# Patient Record
Sex: MALE | Race: WHITE | URBAN - METROPOLITAN AREA
[De-identification: names, ages, dates, MRNs, and addresses within clinical notes are randomized per-mention and may not be internally consistent; named-entity substitution may affect disease eponyms.]

---

## 2017-10-31 ENCOUNTER — HOSPITAL ENCOUNTER (OUTPATIENT)
Dept: PHYSICAL THERAPY | Age: 57
Discharge: HOME | End: 2017-10-31
Payer: COMMERCIAL

## 2017-10-31 PROCEDURE — 97163 PT EVAL HIGH COMPLEX 45 MIN: CPT

## 2017-10-31 PROCEDURE — 97110 THERAPEUTIC EXERCISES: CPT

## 2017-10-31 NOTE — PROGRESS NOTES
PT DAILY TREATMENT NOTE     Patient Name: Alexandra Lorenzana  Date:10/31/2017  : 1960  [x]  Patient  Verified  Payor: BLUE [employer]OSS / Plan: [employer] [facility] [place] / Product Type: PPO /    In time:135  Out time:230  Total Treatment Time (min): 55  Visit #: 1 of 1    Treatment Area: There are no admission diagnoses documented for this encounter.     SUBJECTIVE  Pain Level (0-10 scale): 0/10 seated at rest  Any medication changes, allergies to medications, adverse drug reactions, diagnosis change, or new procedure performed?: [x] No    [] Yes (see summary sheet for update)  Subjective functional status/changes:   [] No changes reported  See POC    OBJECTIVE    Modality rationale:    Min Type Additional Details    [] Estim:  []Unatt       []IFC  []Premod                        []Other:  []w/ice   []w/heat  Position:  Location:    [] Estim: []Att    []TENS instruct  []NMES                    []Other:  []w/US   []w/ice   []w/heat  Position:  Location:    []  Traction: [] Cervical       []Lumbar                       [] Prone          []Supine                       []Intermittent   []Continuous Lbs:  [] before manual  [] after manual    []  Ultrasound: []Continuous   [] Pulsed                           []1MHz   []3MHz W/cm2:  Location:    []  Iontophoresis with dexamethasone         Location: [] Take home patch   [] In clinic    []  Ice     []  heat  []  Ice massage  []  Laser   []  Anodyne Position:  Location:    []  Laser with stim  []  Other:  Position:  Location:    []  Vasopneumatic Device Pressure:       [] lo [] med [] hi   Temperature: [] lo [] med [] hi   [] Skin assessment post-treatment:  []intact []redness- no adverse reaction    []redness  adverse reaction:     45 min [x]Eval                  []Re-Eval       10 min Therapeutic Exercise:  [] See flow sheet :issued and reviewed HEP   Rationale: increase strength to improve the patients ability to require less assist for transfers and decrease fall risk     min Therapeutic Activity:  []  See flow sheet :         min Neuromuscular Re-education:  []  See flow sheet :        min Manual Therapy:          min Gait Training:  ___ feet with ___ device on level surfaces with ___ level of assist   Rationale: With   [] TE   [] TA   [] neuro   [] other: Patient Education: [x] Review HEP    [] Progressed/Changed HEP based on:   [] positioning   [] body mechanics   [] transfers   [] heat/ice application    [] other:      Other Objective/Functional Measures:   Physical Therapy Evaluation  Neurologic  Pt presents with congenital demyelinating polyneuropathy that has progressively worsened over time. Pt's primary means of community mobility is currently a power scooter that he has had for the past 11 years. Prior to use of power scooter, pt ambulated with bilateral axillary crutches as primary means of mobility. Currently pt only able to ambulate very short distances in home (bedroom to bathroom) and this is becoming increasingly difficult as pt is considered a significant fall risk. His condition is complicated by intermittent back pain related to scoliosis and arthritis. He also reports decreased sensation in LE's > UE's. Pt right hand dominant. He lives with his parents who are both in their [de-identified] and ability to provide assistance to pt has decreased secondary to their own health problems and aging limitations.    Present Functional Limitations: walking, transfers sit to stand to include out of shower and off toilet, meal prep  PLOF: pt ambulating with bilateral axillary crutches in community > 11 years ago    Posture: [x] Poor    [] Fair    [] Good    Describe:       Gait: [] Normal    [x] Abnormal    Device: bilateral axillary crutches    Describe:    ROM:                             AROM    PROM   Shoulder Left Right Left Right   Flex       Ext       ABD       ER       IR                AROM    PROM   Knee Left Right Left Right   Ext       Flex AROM                           PROM  Hip Left Right Left Right   Flex       Ext       ABD       ER       IR                                                AROM      PROM   Ankle Left Right Left Right   Ext       Flex         Strength (MMT):  Shoulder L (1-5) R (1-5)   Shoulder Flexion     Shoulder Ext     Shoulder ABD     Shoulder ADD     Shoulder IR     Shoulder ER                                               Hip L (1-5) R (1-5)   Hip Flexion 3-/5 <3/5   Hip Ext (seated) 4+/5 4+/5   Hip ABD (seated) 4-/5 4-/5   Hip ADD (seated) 4/5 4/5   Hip ER     Hip IR       Knee L (1-5) R (1-5)   Knee Flexion in sitting 4+/5 4/5   Knee Extension 3/5 3-/5   Ankle PF     Ankle DF <3/5 <3/5   Other       Tone:    Motor Control:    Sensation:    Reflexes: [] Not Tested   Left Right   Biceps (C5)     Brachioradiais (C6)     Triceps (C7)     Knee Jerk (L4)     Ankle Jerk (SI)       Balance/ Equilibrium:              Left            Right  Tracks Across Midline      Reaches Across Midline           Sitting Balance: Static:  [] Good    [x] Fair    [] Poor     Dynamic:   [] Good    [] Fair    [] Poor        Standing Balance: Static:   [] Good    [x] Fair (-)    [x] Poor     Dynamic:   [] Good    [] Fair    [x] Poor        Protective Extension:  [] Present    [] Delayed    [] Absent        Single Leg Stance:         Eyes Open  Eyes Closed   L  L    R  R        Functional Mobility      Bed Mobility:      Scooting:        Rolling:       Sit-Supine:      Transfers:       Sit-Stand: CGA-min assist       Floor-Stand:       Gait: CGA- min assist with B axillary crutches       Tandem:       Backwards:       Braiding:      Elevations:       Curbs:      Ramps:      Stairs:    Behavior: [] Cooperative    [] Impulsive    [] Agitated    [] Perseverative    [] Confused   Oriented x:    Cognition: [] One Step Commands   [] Multiple Commands   [] Displays Neglect [] R  [] L    Other:       Impaired Judgement: [] Y    [] N      Impaired Vision:  [] Y [] N      Safety Awareness Deficits  [] Y    [] N      Impaired Hearing  [] Y    [] N      Able to Express Needs [] Y    [] N    Optional Tests:       Dynamic Gait Index (24pt scale): Functional Gait Assessment (30pt scale):       Blackmon Balance Scale (56pt scale): Other test /comments:  Significant HS tightness bilaterally. Intrinsic foot and hand weakness noted bilaterally   strength: 5 lbs on left, 2 lbs on right (average of 2 trials)  Bilateral shoulder AROM limited for flex, ER, and IR. Hyperextension B elbows noted.     Pain Level (0-10 scale) post treatment: 0/10    ASSESSMENT/Changes in Function: see POC       [x]  See Plan of Care  []  See progress note/recertification  [x]  See Discharge Summary         Progress towards goals / Updated goals:  See POC    PLAN  []  Upgrade activities as tolerated     []  Continue plan of care  []  Update interventions per flow sheet       [x]  Discharge due to: pt to be seen one time for eval/assessment for power chair  []  Other:_      Kelechi Butler PT 10/31/2017  1:10 PM    Future Appointments  Date Time Provider Leeann Waite   10/31/2017 1:30 PM Kelechi Butler, PT [id number] [place]

## 2017-10-31 NOTE — PROGRESS NOTES
[facility] at [address] [place] [address]  Phone: [phone]   Fax: [phone]    Plan of Care/ Statement of Necessity for Physical Therapy Services     Patient name: Patti Menjivar Start of Care: 10/31/2017   Referral source: Sondra Rivas MD : 1960    Medical Diagnosis: Unsteadiness on feet [R26.81]   Onset Date:chronic since birth    Treatment Diagnosis: imbalance, gait abnormality, weakness   Prior Hospitalization: see medical history Provider#: [id number]   Medications: Verified on Patient summary List    Comorbidities: arthritis, scoliosis   Prior Level of Function: ambulating with B axillary crutches > 11 year ago    The Plan of Care and following information is based on the information from the initial evaluation. Assessment/ key information: Pt is a 64 yo male presenting to clinic with congenital demyelinating polyneuropathy that has progressively worsened over time. Pt's primary means of community mobility is currently a power scooter that he has had for the past 11 years and in need of replacement with pt no longer able to rely on this device. Prior to use of power scooter, pt ambulated with bilateral axillary crutches as primary means of mobility. Currently pt only able to ambulate ten feet in home (bedroom to bathroom for example) and this is becoming increasingly difficult as pt is considered a significant fall risk with severe ataxic gait pattern, making the use of a cane, crutches, or walker unsafe. His condition is complicated by intermittent back pain related to scoliosis and arthritis. He also reports decreased sensation in LE's > UE's. Pt right hand dominant. He lives with his parents who are both in their [de-identified] and ability to provide assistance to pt has decreased secondary to their own health problems and aging limitations.  On exam, pt noted to have significant intrinsic foot and hand muscle weakness bilaterally with  strength 5 lbs on left and 2 lbs on right (average of 2 trials) and as such would be unable to mobilize an optimally-configured manual wheelchair. Bilateral shoulder AROM limited for flex, ER, and IR with bilateral hyperextension noted and < 4/5 UE strength. Pt required min assist for sit stand transfers in clinic with B axillary crutches and CGA-min assist to ambulate 10 feet with B axillary crutches and increased time to complete task. Strength (MMT):  Hip L (1-5) R (1-5)   Hip Flexion 3-/5 <3/5   Hip Ext (seated) 4+/5 4+/5   Hip ABD (seated) 4-/5 4-/5   Hip ADD (seated) 4/5 4/5   Hip ER       Hip IR          Knee L (1-5) R (1-5)   Knee Flexion in sitting 4+/5 4/5   Knee Extension 3/5 3-/5   Ankle PF       Ankle DF <3/5 <3/5   Other         Sitting Balance: Static:                  [] Good    [x] Fair    [] Poor                Dynamic:                 [] Good    [x] Fair    [x] Poor       Standing Balance: Static:             [] Good    [x] Fair     [x] Poor                          Dynamic:                [] Good    [] Fair    [x] Poor  Due to the findings described above and the nature of his underlying condition that is progressive, pt requires use of power chair device for safely negotiating his home environment to include his bedroom, bathroom for hygiene and kitchen for meal prep and eating. He will not be able to perform and complete the aforementioned ADL's without a power chair. Pt is also unable to use a power scooter due to its bulky size and the need to negotiate smaller spaces in his home. Evaluation Complexity History HIGH Complexity :3+ comorbidities / personal factors will impact the outcome/ POC ; Examination HIGH Complexity : 4+ Standardized tests and measures addressing body structure, function, activity limitation and / or participation in recreation  ;Presentation HIGH Complexity : Unstable and unpredictable characteristics  ; Clinical Decision Making Other outcome measures clinical judgment  HIGH   Overall Complexity Rating: HIGH   Problem List: pain affecting function, decrease ROM, decrease strength, impaired gait/ balance, decrease ADL/ functional abilitiies, decrease activity tolerance, decrease flexibility/ joint mobility and decrease transfer abilities   Treatment Plan may include any combination of the following: N/A  Patient / Family readiness to learn indicated by: asking questions, trying to perform skills and interest  Persons(s) to be included in education: patient (P) and family support person (FSP);list pt's mother  Barriers to Learning/Limitations: None  Patient Goal (s): be assessed for power chair and get exercises to do at home  Patient Self Reported Health Status: fair  Rehabilitation Potential: fair    Short Term Goals:  N/A  Long Term Goals:   N/A     Frequency / Duration: Patient to be seen 1 time for eval/assessment for power chair    Patient/ Caregiver education and instruction: Diagnosis, prognosis, exercises   [x]  Plan of care has been reviewed with DEWAYNE Byrne PT 10/31/2017 2:36 PM  _____________________________________________________________________  I certify that the above Therapy Services are being furnished while the patient is under my care. I agree with the treatment plan and certify that this therapy is necessary.     Physician's Signature:____________________  Date:__________Time:______    Please sign and return to [facility] at [address] [place] [address]  Phone: [phone]   Fax: [phone]